# Patient Record
Sex: FEMALE | Race: WHITE | NOT HISPANIC OR LATINO | Employment: PART TIME | URBAN - METROPOLITAN AREA
[De-identification: names, ages, dates, MRNs, and addresses within clinical notes are randomized per-mention and may not be internally consistent; named-entity substitution may affect disease eponyms.]

---

## 2024-01-17 NOTE — PROGRESS NOTES
"Well Woman Visit    CC: Scheduled annual well gyn visit  Chief Complaint   Patient presents with    Discuss fertility     Has been trying to conceive x 6 months, wants to make sure everything is ok.       Myriad intake in the past?: {YES NO:72171}    {IF HAS FILLED OUT MYRIADTABLET IN PAST (Optional):14725}    {Birth control or /HRT (Optional):73033}    HPI:   18 y.o.     Menses:   q *** days, lasts *** days, changes products q ***hrs on heaviest days.     Pain:  {APPAIN:50172}    {PCP:22996}  History: PMHx, Meds, Allergies, PSHx, Social Hx, and POBHx all reviewed and updated.    {APWWECO (Optional):04741}    PHYSICAL EXAM:  /72   Ht 153.7 cm (60.5\")   Wt 75.8 kg (167 lb)   LMP 2023 (Exact Date)   BMI 32.08 kg/m²  Not found.  General- NAD, alert and oriented, appropriate  Psych- Normal mood, good memory  Neck- No masses, no thyroid enlargement  CV- Regular rhythm, no murnurs  Resp- CTA to bases, no wheezes  Abdomen- Soft, non distended, non tender, no masses    Breast left-  Bilaterally symmetrical, no masses, non tender, no nipple discharge  Breast right- Bilaterally symmetrical, no masses, non tender, no nipple discharge    External genitalia- Normal female, no lesions  Urethra/meatus- Normal, no masses, non tender  Bladder- Normal, no masses, non tender  Vagina- Normal, no atrophy, no lesions, no discharge.  {Prolapse (Optional):98889}  Cvx- {APCVX:52054}  Uterus- {APUTERUS:67925}  Adnexa- {APADNEXA:59289}  Anus/Rectum/Perineum- {APRECTAL:96963}    Lymphatic- No palpable neck, axillary, or groin nodes  Ext- No edema, no cyanosis    Skin- No lesions, no rashes, no acanthosis nigricans  {APNEXPLANON (Optional):18236}    ASSESSMENT and PLAN:    Diagnoses and all orders for this visit:    1. Screen for STD (sexually transmitted disease) (Primary)        Preventative:  {WWE Preventative:80345}  {MYRIAD (Optional):02817}  {APGYNCOUNSELING (Optional):00337}  She understands the importance of " having any ordered tests to be performed in a timely fashion.  The risks of not performing them include, but are not limited to, advanced cancer stages, bone loss from osteoporosis and/or subsequent increase in morbidity and/or mortality.  She is encouraged to review her results online and/or contact or office if she has questions.     Follow Up:  No follow-ups on file.    {Separate E+M Time Carve Out (Optional):60696}  {Time Spent-Use for E/M Coding (Optional):05904}      Radha Guerra MD  01/23/2024    [facility]  [facility] [place] [address]  Dept: [phone]  Dept Fax: [phone]  Loc: [phone]  Loc Fax: [phone]

## 2024-01-23 ENCOUNTER — OFFICE VISIT (OUTPATIENT)
Dept: OBSTETRICS AND GYNECOLOGY | Facility: CLINIC | Age: 19
End: 2024-01-23
Payer: COMMERCIAL

## 2024-01-23 ENCOUNTER — PATIENT ROUNDING (BHMG ONLY) (OUTPATIENT)
Dept: OBSTETRICS AND GYNECOLOGY | Facility: CLINIC | Age: 19
End: 2024-01-23
Payer: COMMERCIAL

## 2024-01-23 VITALS
DIASTOLIC BLOOD PRESSURE: 72 MMHG | BODY MASS INDEX: 31.53 KG/M2 | SYSTOLIC BLOOD PRESSURE: 116 MMHG | HEIGHT: 61 IN | WEIGHT: 167 LBS

## 2024-01-23 DIAGNOSIS — Z31.69 ENCOUNTER FOR PRECONCEPTION CONSULTATION: Primary | ICD-10-CM

## 2024-01-23 DIAGNOSIS — Z11.3 SCREEN FOR STD (SEXUALLY TRANSMITTED DISEASE): ICD-10-CM

## 2024-01-23 RX ORDER — SODIUM FLUORIDE AND POTASSIUM NITRATE 5.8; 57.5 MG/ML; MG/ML
GEL, DENTIFRICE DENTAL
COMMUNITY
Start: 2023-10-20

## 2024-01-23 NOTE — PROGRESS NOTES
A My-Chart message has been sent to the patient for PATIENT ROUNDING with [facility].

## 2024-01-23 NOTE — PROGRESS NOTES
"GYN new patient    CC: preconception    Tobacco/Nicotine use:  Yes; vapes a few times a day    HPI:   18 y.o. Contraception or HRT: Contraception:  None  Menses:   q 30 days, lasts 4-5 days, changes products q 4 hrs on heaviest days.   Pain:  None    Pt is interested in conception. No dysmenorrhea, no dyspareunia, no h/o STI/PID, no appendicitis. Has been without contraception for 6 months.  Has never used hormonal contraception.  Her partner does have a child from a previous relationship.  Has intercourse 3x/week on average      History: PMHx, Meds, Allergies, PSHx, Social Hx, and POBHx all reviewed and updated.  PCP:Provider, No Known      Review of Systems     /72   Ht 153.7 cm (60.5\")   Wt 75.8 kg (167 lb)   LMP 2023 (Exact Date)   BMI 32.08 kg/m²     Physical Exam  Vitals and nursing note reviewed.   Constitutional:       Appearance: Normal appearance.   Cardiovascular:      Rate and Rhythm: Normal rate and regular rhythm.      Heart sounds: Normal heart sounds.   Pulmonary:      Effort: Pulmonary effort is normal.      Breath sounds: Normal breath sounds.   Abdominal:      General: Abdomen is flat. Bowel sounds are normal.      Palpations: Abdomen is soft.   Neurological:      Mental Status: She is alert.         ASSESSMENT AND PLAN:  Problem Visit    Diagnoses and all orders for this visit:    1. Encounter for preconception consultation (Primary)  Assessment & Plan:  Pt states she would like to conceive  She Has been having cycles q 30 days for 4-5 days and not using any contraception for the past 6 months.  She has no dysmenorrhea, dyspareunia, STD/PID, appendicitis or chronic pelvic pain  States they have intercourse 3x/week  Her partner has a child from a previous relationship  Explained to the patient I recommend continued timed intercourse  Start PNV   At this point I don't see any obvious concerns.  If no conception in another 6 months would begin an evaluation at that " time      Orders:  -     Prenatal Vit-Iron Carbonyl-FA (PNV Tabs 29-1) 29-1 MG tablet; Take 1 tablet by mouth Daily.  Dispense: 100 tablet; Refill: 3    2. Screen for STD (sexually transmitted disease)  -     Chlamydia / Gonococcus / Mycoplasma Genitalium, NITISH, Urine - Urine, Clean Catch        Counseling:     PNV              Follow Up:  Return for Annual physical.        Radha Guerra MD  01/23/2024

## 2024-01-28 PROBLEM — Z31.69 ENCOUNTER FOR PRECONCEPTION CONSULTATION: Status: ACTIVE | Noted: 2024-01-28

## 2024-01-28 RX ORDER — PRENATAL VIT,CAL 76/IRON/FOLIC 29 MG-1 MG
1 TABLET ORAL DAILY
Qty: 100 TABLET | Refills: 3 | Status: SHIPPED | OUTPATIENT
Start: 2024-01-28

## 2024-01-28 NOTE — ASSESSMENT & PLAN NOTE
Pt states she would like to conceive  She Has been having cycles q 30 days for 4-5 days and not using any contraception for the past 6 months.  She has no dysmenorrhea, dyspareunia, STD/PID, appendicitis or chronic pelvic pain  States they have intercourse 3x/week  Her partner has a child from a previous relationship  Explained to the patient I recommend continued timed intercourse  Start PNV   At this point I don't see any obvious concerns.  If no conception in another 6 months would begin an evaluation at that time

## 2024-02-02 PROCEDURE — 87086 URINE CULTURE/COLONY COUNT: CPT

## 2024-02-03 ENCOUNTER — TELEPHONE (OUTPATIENT)
Dept: URGENT CARE | Facility: CLINIC | Age: 19
End: 2024-02-03
Payer: COMMERCIAL

## 2024-02-03 NOTE — TELEPHONE ENCOUNTER
----- Message from KIKO Diaz sent at 2/3/2024  2:20 PM EST -----  Please call the patient regarding her urine culture. If she is symptomatic for UTI she will need to have a recollection due to culture results being indicative of contamination.

## 2024-05-01 ENCOUNTER — TELEPHONE (OUTPATIENT)
Dept: OBSTETRICS AND GYNECOLOGY | Facility: CLINIC | Age: 19
End: 2024-05-01
Payer: COMMERCIAL

## 2024-05-28 NOTE — PROGRESS NOTES
Well Woman Visit    CC: Scheduled annual well gyn visit  Chief Complaint   Patient presents with    Annual Exam     High anxiety with exams       Myriad intake in the past?: No        Contraception:  None    HPI:   19 y.o.     Menses:   q 30 days, lasts 4-5 days, changes products q  4 hrs on heaviest days.   Pain:  None    PCP: does manage PMHx and preventative labs  History: PMHx, Meds, Allergies, PSHx, Social Hx, and POBHx all reviewed and updated.    Pt has no complaints today.    PHYSICAL EXAM:  /76   Wt 76.7 kg (169 lb)   LMP 2024 (Exact Date)   BMI 32.46 kg/m²  Not found.  General- NAD, alert and oriented, appropriate  Psych- Normal mood, good memory  Neck- No masses, no thyroid enlargement  CV- Regular rhythm, no murnurs  Resp- CTA to bases, no wheezes  Abdomen- Soft, non distended, non tender, no masses    Breast left-  Bilaterally symmetrical, no masses, non tender, no nipple discharge  Breast right- Bilaterally symmetrical, no masses, non tender, no nipple discharge    External genitalia- Normal female, no lesions  Urethra/meatus- Normal, no masses, non tender  Bladder- Normal, no masses, non tender  Vagina- Normal, no atrophy, no lesions, no discharge.    Cvx- Normal, no lesions, no discharge, No cervical motion tenderness  Uterus- Normal size, shape & consistency.  Non tender, mobile.  Adnexa- No mass, non tender  Anus/Rectum/Perineum- Not performed    Lymphatic- No palpable neck, axillary, or groin nodes  Ext- No edema, no cyanosis    Skin- No lesions, no rashes, no acanthosis nigricans      ASSESSMENT and PLAN:    Diagnoses and all orders for this visit:    1. Well woman exam with routine gynecological exam (Primary)  -     IGP,rfx Aptima HPV All Pth  -     Chlamydia trachomatis, Neisseria gonorrhoeae, PCR - Swab, Cervix    2. Screening examination for STD (sexually transmitted disease)  -     Chlamydia trachomatis, Neisseria gonorrhoeae, PCR - Swab, Cervix    3. Encounter for  preconception consultation  Assessment & Plan:  Patient has been trying for conception for little over a year.  This has not happened.  The patient has regular, ovulatory cycles.  She has used ovulation predictor kits with no success.  Her partner has a child from a previous relationship.  The patient is having a pelvic ultrasound later today.  Patient was screened for STDs.  I recommend checking a semen analysis if the ultrasound and semen analysis are normal we would consider an HSG.  Patient states she did not tolerate the prescription prenatal vitamins.  She is taking prenatal Gummies          Preventative:  CERVICAL CANCER Screening- Reviewed current ASCCP guidelines for screening w and wo cotest HPV, age specific.  Screen: Updated today      She understands the importance of having any ordered tests to be performed in a timely fashion.  The risks of not performing them include, but are not limited to, advanced cancer stages, bone loss from osteoporosis and/or subsequent increase in morbidity and/or mortality.  She is encouraged to review her results online and/or contact or office if she has questions.     Follow Up:  Return in about 1 month (around 6/30/2024).            Radha Guerra MD  05/30/2024    [facility] [place] [facility]  [address] [place] [address]  Dept: [phone]  Dept Fax: [phone]  Loc: [phone]  Loc Fax: [phone]

## 2024-05-30 ENCOUNTER — OFFICE VISIT (OUTPATIENT)
Dept: OBSTETRICS AND GYNECOLOGY | Facility: CLINIC | Age: 19
End: 2024-05-30
Payer: COMMERCIAL

## 2024-05-30 VITALS — WEIGHT: 169 LBS | DIASTOLIC BLOOD PRESSURE: 76 MMHG | BODY MASS INDEX: 32.46 KG/M2 | SYSTOLIC BLOOD PRESSURE: 124 MMHG

## 2024-05-30 DIAGNOSIS — Z01.419 WELL WOMAN EXAM WITH ROUTINE GYNECOLOGICAL EXAM: Primary | ICD-10-CM

## 2024-05-30 DIAGNOSIS — Z31.69 ENCOUNTER FOR PRECONCEPTION CONSULTATION: ICD-10-CM

## 2024-05-30 DIAGNOSIS — Z11.3 SCREENING EXAMINATION FOR STD (SEXUALLY TRANSMITTED DISEASE): ICD-10-CM

## 2024-05-30 LAB
C TRACH RRNA CVX QL NAA+PROBE: NOT DETECTED
N GONORRHOEA RRNA SPEC QL NAA+PROBE: NOT DETECTED

## 2024-05-30 PROCEDURE — 87591 N.GONORRHOEAE DNA AMP PROB: CPT | Performed by: OBSTETRICS & GYNECOLOGY

## 2024-05-30 PROCEDURE — 87491 CHLMYD TRACH DNA AMP PROBE: CPT | Performed by: OBSTETRICS & GYNECOLOGY

## 2024-05-30 PROCEDURE — G0123 SCREEN CERV/VAG THIN LAYER: HCPCS | Performed by: OBSTETRICS & GYNECOLOGY

## 2024-05-30 NOTE — ASSESSMENT & PLAN NOTE
Patient has been trying for conception for little over a year.  This has not happened.  The patient has regular, ovulatory cycles.  She has used ovulation predictor kits with no success.  Her partner has a child from a previous relationship.  The patient is having a pelvic ultrasound later today.  Patient was screened for STDs.  I recommend checking a semen analysis if the ultrasound and semen analysis are normal we would consider an HSG.  Patient states she did not tolerate the prescription prenatal vitamins.  She is taking prenatal Gummies

## 2024-05-31 ENCOUNTER — TELEPHONE (OUTPATIENT)
Dept: OBSTETRICS AND GYNECOLOGY | Facility: CLINIC | Age: 19
End: 2024-05-31
Payer: COMMERCIAL

## 2024-05-31 NOTE — TELEPHONE ENCOUNTER
[id number]: lm re next appt in system. If this day and time dont work please call the office to jean/sb

## 2024-06-03 ENCOUNTER — OFFICE VISIT (OUTPATIENT)
Dept: OBSTETRICS AND GYNECOLOGY | Facility: CLINIC | Age: 19
End: 2024-06-03
Payer: COMMERCIAL

## 2024-06-03 VITALS
BODY MASS INDEX: 32.46 KG/M2 | SYSTOLIC BLOOD PRESSURE: 122 MMHG | HEART RATE: 101 BPM | WEIGHT: 169 LBS | DIASTOLIC BLOOD PRESSURE: 81 MMHG

## 2024-06-03 DIAGNOSIS — Z31.69 ENCOUNTER FOR PRECONCEPTION CONSULTATION: Primary | ICD-10-CM

## 2024-06-03 PROCEDURE — 99213 OFFICE O/P EST LOW 20 MIN: CPT | Performed by: OBSTETRICS & GYNECOLOGY

## 2024-06-03 NOTE — PROGRESS NOTES
GYN Visit    CC: Preconception counseling    HPI:   19 y.o. Contraception or HRT: Contraception:  None    Pt has no complaints today.      History: PMHx, Meds, Allergies, PSHx, Social Hx, and POBHx all reviewed and updated.  Physical Exam   PHYSICAL EXAM:  /81   Pulse 101   Wt 76.7 kg (169 lb)   LMP 2024 (Exact Date)   Breastfeeding No   BMI 32.46 kg/m²   General- NAD, alert and oriented, appropriate  Psych- Normal mood, good memory  US Pelvic Complete, Abd + Endovag (2024 14:27)      ASSESSMENT AND PLAN:  Diagnoses and all orders for this visit:    1. Encounter for preconception consultation (Primary)  Assessment & Plan:  Patient is here to follow-up pelvic ultrasound.  Her ultrasound is unremarkable.  Her partner has not yet had a semen analysis performed.  She states that will be done in a week or 2.  We discussed the possible role of scheduling an HSG if semen analysis is negative.  Patient may also benefit from referral to reproductive endocrinology at that time.                  Follow Up:  No follow-ups on file.          Radha Guerra MD  2024

## 2024-06-03 NOTE — ASSESSMENT & PLAN NOTE
Patient is here to follow-up pelvic ultrasound.  Her ultrasound is unremarkable.  Her partner has not yet had a semen analysis performed.  She states that will be done in a week or 2.  We discussed the possible role of scheduling an HSG if semen analysis is negative.  Patient may also benefit from referral to reproductive endocrinology at that time.

## 2024-06-04 LAB
CONV .: NORMAL
CYTOLOGIST CVX/VAG CYTO: NORMAL
CYTOLOGY CVX/VAG DOC CYTO: NORMAL
CYTOLOGY CVX/VAG DOC THIN PREP: NORMAL
DX ICD CODE: NORMAL
Lab: NORMAL
OTHER STN SPEC: NORMAL
STAT OF ADQ CVX/VAG CYTO-IMP: NORMAL

## 2024-11-11 NOTE — PROGRESS NOTES
GYN Visit    CC: Conception    HPI:   19 y.o. Contraception or HRT: None    Pt has no complaints today.    History: PMHx, Meds, Allergies, PSHx, Social Hx, and POBHx all reviewed and updated.    PHYSICAL EXAM:  /75   Pulse 89   Wt 74.8 kg (165 lb)   LMP 11/10/2024 (Exact Date)   Breastfeeding No   BMI 31.69 kg/m²   General- NAD, alert and oriented, appropriate  Psych- Normal mood, good memory        ASSESSMENT AND PLAN:  Diagnoses and all orders for this visit:    1. Encounter for preconception consultation (Primary)  Assessment & Plan:  Patient's partner semen analysis is normal.  Patient's cycles remain ovulatory.  Patient states she and her partner have intercourse 2-3 times a week.  I recommend patient see reproductive endocrinology for further E&M.  Patient was counseled I do not know if these visits will be covered by insurance and they most likely will not be.  Recommend patient continue taking prenatal vitamins OTC that will not contain DHA    Orders:  -     Ambulatory Referral to Infertility            Follow Up:  Return for Annual physical.          Radha Guerra MD  2024

## 2024-11-13 ENCOUNTER — OFFICE VISIT (OUTPATIENT)
Dept: OBSTETRICS AND GYNECOLOGY | Facility: CLINIC | Age: 19
End: 2024-11-13
Payer: COMMERCIAL

## 2024-11-13 VITALS
DIASTOLIC BLOOD PRESSURE: 75 MMHG | HEART RATE: 89 BPM | BODY MASS INDEX: 31.69 KG/M2 | SYSTOLIC BLOOD PRESSURE: 112 MMHG | WEIGHT: 165 LBS

## 2024-11-13 DIAGNOSIS — Z31.69 ENCOUNTER FOR PRECONCEPTION CONSULTATION: Primary | ICD-10-CM

## 2024-11-13 RX ORDER — HYDROCHLOROTHIAZIDE 12.5 MG/1
12.5 TABLET ORAL DAILY
COMMUNITY
Start: 2024-09-24 | End: 2025-03-23

## 2024-11-13 NOTE — ASSESSMENT & PLAN NOTE
Patient's partner semen analysis is normal.  Patient's cycles remain ovulatory.  Patient states she and her partner have intercourse 2-3 times a week.  I recommend patient see reproductive endocrinology for further E&M.  Patient was counseled I do not know if these visits will be covered by insurance and they most likely will not be.  Recommend patient continue taking prenatal vitamins OTC that will not contain DHA

## 2025-03-17 ENCOUNTER — OFFICE VISIT (OUTPATIENT)
Dept: OBSTETRICS AND GYNECOLOGY | Facility: CLINIC | Age: 20
End: 2025-03-17
Payer: COMMERCIAL

## 2025-03-17 VITALS
DIASTOLIC BLOOD PRESSURE: 89 MMHG | WEIGHT: 166 LBS | HEIGHT: 61 IN | BODY MASS INDEX: 31.34 KG/M2 | SYSTOLIC BLOOD PRESSURE: 136 MMHG | HEART RATE: 118 BPM

## 2025-03-17 DIAGNOSIS — Z32.02 PREGNANCY TEST NEGATIVE: ICD-10-CM

## 2025-03-17 DIAGNOSIS — Z31.9 INFERTILITY MANAGEMENT: Primary | ICD-10-CM

## 2025-03-17 PROBLEM — Z31.69 ENCOUNTER FOR PRECONCEPTION CONSULTATION: Status: RESOLVED | Noted: 2024-01-28 | Resolved: 2025-03-17

## 2025-03-17 LAB
B-HCG UR QL: NEGATIVE
EXPIRATION DATE: NORMAL
INTERNAL NEGATIVE CONTROL: NORMAL
INTERNAL POSITIVE CONTROL: NORMAL
Lab: NORMAL

## 2025-03-17 NOTE — ASSESSMENT & PLAN NOTE
Recommend daily multivitamin with folic acid or prenatal vitamin if not preventing pregnancy  Check CBC, CMP, AMH, TSH and free T4, and cycle day 21 progesterone  Check FSH and estradiol on cycle day 3  If labs are all reassuring and ovulation is confirmed then I would recommend HSG

## 2025-03-17 NOTE — PROGRESS NOTES
"[facility]  Gynecological Visit    CC: Infertility    Subjective:   19 y.o. who presents for evaluation due to infertility.  The patient reports that she has been with her current partner and trying to conceive for 3 years.  She reports that have regular unprotected intercourse but she has never even had a pregnancy scare.  She reports that her cycles are regular occurring monthly lasting 3 to 5 days.  She denies any significant dysmenorrhea.  She reports her partner has had a semen analysis and she has had a normal ultrasound.    History:   Past medical history, medications, allergies, surgical history, social history, and obstetrical history all reviewed and updated.    Last Completed Pap Smear            Upcoming       PAP SMEAR (Every 3 Years) Next due on 2024  IGP,rfx Aptima HPV All Pth                          Objective:/89   Pulse 118   Ht 153.7 cm (60.5\")   Wt 75.3 kg (166 lb)   Breastfeeding No   BMI 31.89 kg/m²     Physical Exam  Vitals and nursing note reviewed.   Constitutional:       General: She is not in acute distress.     Appearance: Normal appearance. She is not ill-appearing.   HENT:      Head: Normocephalic and atraumatic.   Neck:      Thyroid: No thyroid mass or thyromegaly.   Abdominal:      General: Abdomen is flat. There is no distension.      Palpations: Abdomen is soft. There is no mass.      Tenderness: There is no abdominal tenderness. There is no guarding or rebound.   Musculoskeletal:         General: No swelling.      Right lower leg: No edema.      Left lower leg: No edema.   Skin:     General: Skin is warm and dry.      Findings: No rash.   Neurological:      Mental Status: She is alert and oriented to person, place, and time.   Psychiatric:         Mood and Affect: Mood normal.         Behavior: Behavior normal.         Thought Content: Thought content normal.       Assessment and Plan:  Diagnoses and all orders for this " visit:    1. Infertility management (Primary)  Assessment & Plan:  Recommend daily multivitamin with folic acid or prenatal vitamin if not preventing pregnancy  Check CBC, CMP, AMH, TSH and free T4, and cycle day 21 progesterone  Check FSH and estradiol on cycle day 3  If labs are all reassuring and ovulation is confirmed then I would recommend HSG      Orders:  -     CBC (No Diff); Future  -     Comprehensive Metabolic Panel; Future  -     TSH; Future  -     T4, free; Future  -     Progesterone; Future  -     Antimullerian Hormone (AMH); Future    2. Pregnancy test negative  -     POC Pregnancy, Urine          Follow Up:  Return if symptoms worsen or fail to improve.    Davey Lane MD  03/17/2025

## 2025-03-21 ENCOUNTER — CLINICAL SUPPORT (OUTPATIENT)
Dept: OBSTETRICS AND GYNECOLOGY | Facility: CLINIC | Age: 20
End: 2025-03-21
Payer: COMMERCIAL

## 2025-03-21 DIAGNOSIS — Z31.9 INFERTILITY MANAGEMENT: ICD-10-CM

## 2025-03-21 LAB
ALBUMIN SERPL-MCNC: 4.2 G/DL (ref 3.5–5.2)
ALBUMIN/GLOB SERPL: 1.4 G/DL
ALP SERPL-CCNC: 63 U/L (ref 39–117)
ALT SERPL W P-5'-P-CCNC: 23 U/L (ref 1–33)
ANION GAP SERPL CALCULATED.3IONS-SCNC: 9.7 MMOL/L (ref 5–15)
AST SERPL-CCNC: 21 U/L (ref 1–32)
BILIRUB SERPL-MCNC: 0.3 MG/DL (ref 0–1.2)
BUN SERPL-MCNC: 10 MG/DL (ref 6–20)
BUN/CREAT SERPL: 11.5 (ref 7–25)
CALCIUM SPEC-SCNC: 9.1 MG/DL (ref 8.6–10.5)
CHLORIDE SERPL-SCNC: 104 MMOL/L (ref 98–107)
CO2 SERPL-SCNC: 22.3 MMOL/L (ref 22–29)
CREAT SERPL-MCNC: 0.87 MG/DL (ref 0.57–1)
DEPRECATED RDW RBC AUTO: 37.7 FL (ref 37–54)
EGFRCR SERPLBLD CKD-EPI 2021: 97.3 ML/MIN/1.73
ERYTHROCYTE [DISTWIDTH] IN BLOOD BY AUTOMATED COUNT: 11.7 % (ref 12.3–15.4)
GLOBULIN UR ELPH-MCNC: 3 GM/DL
GLUCOSE SERPL-MCNC: 61 MG/DL (ref 65–99)
HCT VFR BLD AUTO: 40.6 % (ref 34–46.6)
HGB BLD-MCNC: 13.8 G/DL (ref 12–15.9)
MCH RBC QN AUTO: 30.3 PG (ref 26.6–33)
MCHC RBC AUTO-ENTMCNC: 34 G/DL (ref 31.5–35.7)
MCV RBC AUTO: 89 FL (ref 79–97)
PLATELET # BLD AUTO: 355 10*3/MM3 (ref 140–450)
PMV BLD AUTO: 11.6 FL (ref 6–12)
POTASSIUM SERPL-SCNC: 3.9 MMOL/L (ref 3.5–5.2)
PROGEST SERPL-MCNC: 7.55 NG/ML
PROT SERPL-MCNC: 7.2 G/DL (ref 6–8.5)
RBC # BLD AUTO: 4.56 10*6/MM3 (ref 3.77–5.28)
SODIUM SERPL-SCNC: 136 MMOL/L (ref 136–145)
T4 FREE SERPL-MCNC: 1.23 NG/DL (ref 0.92–1.68)
TSH SERPL DL<=0.05 MIU/L-ACNC: 1.46 UIU/ML (ref 0.27–4.2)
WBC NRBC COR # BLD AUTO: 10.32 10*3/MM3 (ref 3.4–10.8)

## 2025-03-21 PROCEDURE — 82397 CHEMILUMINESCENT ASSAY: CPT | Performed by: OBSTETRICS & GYNECOLOGY

## 2025-03-21 PROCEDURE — 80050 GENERAL HEALTH PANEL: CPT | Performed by: OBSTETRICS & GYNECOLOGY

## 2025-03-21 PROCEDURE — 84439 ASSAY OF FREE THYROXINE: CPT | Performed by: OBSTETRICS & GYNECOLOGY

## 2025-03-21 PROCEDURE — 84144 ASSAY OF PROGESTERONE: CPT | Performed by: OBSTETRICS & GYNECOLOGY

## 2025-03-21 NOTE — PROGRESS NOTES
Venipuncture Blood Specimen Collection  Venipuncture performed in left arm by Jayne Lundberg with good hemostasis. Patient tolerated the procedure well without complications.   03/21/25   Jayne Lundberg

## 2025-03-22 DIAGNOSIS — Z31.9 INFERTILITY MANAGEMENT: Primary | ICD-10-CM

## 2025-03-25 ENCOUNTER — TELEPHONE (OUTPATIENT)
Dept: OBSTETRICS AND GYNECOLOGY | Facility: CLINIC | Age: 20
End: 2025-03-25
Payer: COMMERCIAL

## 2025-03-25 NOTE — TELEPHONE ENCOUNTER
"Per Dr. Lane: \"Please notify the patient that all of her labs are normal. Her progesterone level was 7.55, which is consistent with ovulation for this past cycle. I would like the patient to come in on cycle day 3, or the third day of menstrual bleeding with this neck cycle for further labs. If her third day of bleeding falls on a weekend she should come in the very next business day/Monday.\"    Left a message for the patient to discuss her lab results.   "

## 2025-03-25 NOTE — TELEPHONE ENCOUNTER
Patient called back and informed of her results. She understands where/when to go if CD3 falls on the weekend.

## 2025-03-26 LAB — MIS SERPL-MCNC: 4.26 NG/ML

## 2025-04-01 ENCOUNTER — CLINICAL SUPPORT (OUTPATIENT)
Dept: OBSTETRICS AND GYNECOLOGY | Age: 20
End: 2025-04-01
Payer: COMMERCIAL

## 2025-04-01 DIAGNOSIS — Z31.9 INFERTILITY MANAGEMENT: ICD-10-CM

## 2025-04-01 PROCEDURE — 83001 ASSAY OF GONADOTROPIN (FSH): CPT | Performed by: OBSTETRICS & GYNECOLOGY

## 2025-04-01 PROCEDURE — 82670 ASSAY OF TOTAL ESTRADIOL: CPT | Performed by: OBSTETRICS & GYNECOLOGY

## 2025-04-01 NOTE — PROGRESS NOTES
Venipuncture Blood Specimen Collection  Venipuncture performed in left arm by Jayne Lundberg with good hemostasis. Patient tolerated the procedure well without complications.   04/01/25   Jayne Lundberg

## 2025-04-02 ENCOUNTER — TELEPHONE (OUTPATIENT)
Dept: OBSTETRICS AND GYNECOLOGY | Age: 20
End: 2025-04-02
Payer: COMMERCIAL

## 2025-04-02 ENCOUNTER — RESULTS FOLLOW-UP (OUTPATIENT)
Dept: OBSTETRICS AND GYNECOLOGY | Age: 20
End: 2025-04-02

## 2025-04-02 ENCOUNTER — PATIENT MESSAGE (OUTPATIENT)
Dept: OBSTETRICS AND GYNECOLOGY | Age: 20
End: 2025-04-02

## 2025-04-02 LAB
ESTRADIOL SERPL HS-MCNC: 26.8 PG/ML
FSH SERPL-ACNC: 5.17 MIU/ML

## 2025-04-29 ENCOUNTER — OFFICE VISIT (OUTPATIENT)
Dept: OBSTETRICS AND GYNECOLOGY | Age: 20
End: 2025-04-29
Payer: COMMERCIAL

## 2025-04-29 VITALS
SYSTOLIC BLOOD PRESSURE: 137 MMHG | HEIGHT: 61 IN | WEIGHT: 170 LBS | DIASTOLIC BLOOD PRESSURE: 85 MMHG | BODY MASS INDEX: 32.1 KG/M2

## 2025-04-29 DIAGNOSIS — Z31.9 INFERTILITY MANAGEMENT: Primary | ICD-10-CM

## 2025-04-29 NOTE — ASSESSMENT & PLAN NOTE
The patient's partner has a normal semen analysis  Her laboratories are reassuring.  Ovulation has been confirmed.  We discussed that our next best option would be to evaluate for tubal factor infertility.  The patient would like to proceed with hysterosalpingogram.  The risk, benefits, and alternatives of that procedure were discussed and the patient wishes to proceed.  I have advised no intercourse until after the study is done.  She will need a beta-hCG the day prior to the HSG here in the office.  Recommend ibuprofen 600 mg 30 minutes prior to the appointment for the HSG.  If the HSG is normal we discussed that our next evaluation step would be diagnostic laparoscopy to evaluate for endometriosis.  The patient understands that that could be normal and without evidence of clear etiology for her infertility.  We discussed it would be completely up to her if she decided to proceed with that evaluation

## 2025-04-29 NOTE — PROGRESS NOTES
"[facility]  Gynecological Visit    CC: Follow-up labs    Subjective:   19 y.o. who presents in follow-up of fertility evaluation.  The patient has no new problems or concerns.    History:   Past medical history, medications, allergies, surgical history, social history, and obstetrical history all reviewed and updated.  Her labs are all reassuring, and her progesterone confirmed ovulation.  Her partner has had a semen analysis that is normal.  The patient would like to proceed with hysterosalpingogram    Last Completed Pap Smear            Upcoming       PAP SMEAR (Every 3 Years) Next due on 2024  IGP,rfx Aptima HPV All Pth                          Objective:/85 (BP Location: Right arm, Patient Position: Sitting, Cuff Size: Adult)   Ht 153.7 cm (60.5\")   Wt 77.1 kg (170 lb)   BMI 32.65 kg/m²     Physical Exam  Vitals and nursing note reviewed.   Constitutional:       General: She is not in acute distress.     Appearance: Normal appearance. She is not ill-appearing.   Neurological:      Mental Status: She is alert and oriented to person, place, and time.   Psychiatric:         Mood and Affect: Mood normal.         Behavior: Behavior normal.         Thought Content: Thought content normal.         Judgment: Judgment normal.       Assessment and Plan:  Diagnoses and all orders for this visit:    1. Infertility management (Primary)  Assessment & Plan:  The patient's partner has a normal semen analysis  Her laboratories are reassuring.  Ovulation has been confirmed.  We discussed that our next best option would be to evaluate for tubal factor infertility.  The patient would like to proceed with hysterosalpingogram.  The risk, benefits, and alternatives of that procedure were discussed and the patient wishes to proceed.  I have advised no intercourse until after the study is done.  She will need a beta-hCG the day prior to the HSG here in the office.  Recommend " ibuprofen 600 mg 30 minutes prior to the appointment for the HSG.  If the HSG is normal we discussed that our next evaluation step would be diagnostic laparoscopy to evaluate for endometriosis.  The patient understands that that could be normal and without evidence of clear etiology for her infertility.  We discussed it would be completely up to her if she decided to proceed with that evaluation    Orders:  -     hCG, Quantitative, Pregnancy; Future  -     FL Cath & Intro Hysterosalpingography; Future        Counseling: TRACK MENSES, RTO if <q21 days (frequent) or >q3mo (infrequent IF not on hormonal BC), >7d long, heavy, or painful.      Follow Up:  Return for After HSG.      Davey Lane MD  04/29/2025

## 2025-05-08 ENCOUNTER — TELEPHONE (OUTPATIENT)
Dept: OBSTETRICS AND GYNECOLOGY | Age: 20
End: 2025-05-08
Payer: COMMERCIAL

## 2025-05-14 ENCOUNTER — CLINICAL SUPPORT (OUTPATIENT)
Dept: OBSTETRICS AND GYNECOLOGY | Age: 20
End: 2025-05-14
Payer: COMMERCIAL

## 2025-05-14 DIAGNOSIS — Z31.9 INFERTILITY MANAGEMENT: ICD-10-CM

## 2025-05-14 LAB — HCG INTACT+B SERPL-ACNC: <1 MIU/ML

## 2025-05-14 PROCEDURE — 84702 CHORIONIC GONADOTROPIN TEST: CPT | Performed by: OBSTETRICS & GYNECOLOGY

## 2025-05-14 NOTE — PROGRESS NOTES
Venipuncture Blood Specimen Collection  Venipuncture performed in left arm by Jayne Lundberg with good hemostasis. Patient tolerated the procedure well without complications.   05/14/25   Jayne Lundberg

## 2025-05-15 ENCOUNTER — HOSPITAL ENCOUNTER (OUTPATIENT)
Dept: GENERAL RADIOLOGY | Facility: HOSPITAL | Age: 20
Discharge: HOME | End: 2025-05-15
Payer: COMMERCIAL

## 2025-05-15 DIAGNOSIS — Z31.9 INFERTILITY MANAGEMENT: ICD-10-CM

## 2025-05-15 PROCEDURE — 74740 X-RAY FEMALE GENITAL TRACT: CPT

## 2025-05-15 PROCEDURE — 25510000001 IOPAMIDOL PER 1 ML: Performed by: OBSTETRICS & GYNECOLOGY

## 2025-05-15 RX ORDER — IOPAMIDOL 755 MG/ML
100 INJECTION, SOLUTION INTRAVASCULAR
Status: COMPLETED | OUTPATIENT
Start: 2025-05-15 | End: 2025-05-15

## 2025-05-15 RX ADMIN — IOPAMIDOL 20 ML: 755 INJECTION, SOLUTION INTRAVENOUS at 08:50

## 2025-05-15 NOTE — PROCEDURES
[facility]    Procedure Report    Patient Name:  Yanira Rawls  YOB: 2005  MRN: [id number]    Date of Surgery:       Pre-op Diagnosis:   Infertility       Post-Op Diagnosis Codes:   Infertility        Staff:  Davey Lane MD    Procedure: HSG    Anesthesia: None    Estimated Blood Loss: None    Specimen:          None    Findings: Normal external genitalia, vagina, normal-appearing cervix.  For the HSG results interpretation please see the radiologist dictated note    Complications: None    Description of Procedure: After reviewing the informed consent, including the risks benefits, and alternatives to the procedure the patient expressed her understanding and wished to proceed.  A surgical timeout was performed.  The patient was placed in a frog-leg position on the fluoroscopy table.  A sterile open sided speculum was inserted into the vagina, to visualize the cervix.  The cervix was then prepped x3 with Betadine.  A balloon style HSG catheter was then introduced into the uterine cavity transcervically.  The balloon was inflated with 3 mL of air.  The speculum was removed, and the patient changed her position to a supine position.  After initial  images were obtained by the radiologist, I then pushed 10 mL of contrast material through the balloon catheter into the uterine cavity under live fluoroscopy.  Images were captured at the radiologist discretion.  At the end of the procedure the balloon was deflated, and the catheter removed.  The patient tolerated procedure well and without incident.  Once the official report has been finalized, I will contact the patient with those findings.        Davey Lane MD     Date: 5/15/2025  Time: 09:08 EDT

## 2025-05-21 ENCOUNTER — OFFICE VISIT (OUTPATIENT)
Dept: OBSTETRICS AND GYNECOLOGY | Age: 20
End: 2025-05-21
Payer: COMMERCIAL

## 2025-05-21 VITALS
HEART RATE: 91 BPM | HEIGHT: 61 IN | BODY MASS INDEX: 32.28 KG/M2 | WEIGHT: 171 LBS | SYSTOLIC BLOOD PRESSURE: 133 MMHG | DIASTOLIC BLOOD PRESSURE: 81 MMHG

## 2025-05-21 DIAGNOSIS — N94.10 FEMALE DYSPAREUNIA: ICD-10-CM

## 2025-05-21 DIAGNOSIS — N94.6 DYSMENORRHEA: Primary | ICD-10-CM

## 2025-05-21 PROCEDURE — 99213 OFFICE O/P EST LOW 20 MIN: CPT | Performed by: OBSTETRICS & GYNECOLOGY

## 2025-05-21 NOTE — PROGRESS NOTES
"[facility]  Gynecological Visit    CC: Follow-up HSG    Subjective:   20 y.o. who presents in follow-up of an HSG.  She reports that she was very crampy and uncomfortable for at least 24 hours after the HSG test.  She reports that she does have some cramping with her menstrual cycles but nothing as bad as she experienced after the HSG.  She also does report she has pain with intercourse.  This is not every episode of intercourse but definitely not infrequent.  Also sometimes is positional.    History:   Past medical history, medications, allergies, surgical history, social history, and obstetrical history all reviewed and updated.    Last Completed Pap Smear            Upcoming       PAP SMEAR (Every 3 Years) Next due on 2024  IGP,rfx Aptima HPV All Pth                          Objective:/81   Pulse 91   Ht 153.7 cm (60.5\")   Wt 77.6 kg (171 lb)   LMP 2025   Breastfeeding No   BMI 32.85 kg/m²     Physical Exam  Vitals and nursing note reviewed.   Constitutional:       General: She is not in acute distress.     Appearance: Normal appearance. She is not ill-appearing.   Neurological:      Mental Status: She is alert and oriented to person, place, and time.   Psychiatric:         Mood and Affect: Mood normal.         Behavior: Behavior normal.         Thought Content: Thought content normal.         Judgment: Judgment normal.       Assessment and Plan:  Diagnoses and all orders for this visit:    1. Dysmenorrhea (Primary)  Assessment & Plan:  I have once again reviewed the HSG findings with the patient.  There was no filling defects in the uterine cavity.  There was good filling spill from the right fallopian tube.  The left fallopian tube filled well however there was no spillage of the contrast media from the left fallopian tube suggesting distal obstruction.  We discussed possible etiologies of this cause for obstruction.  We discussed that sometimes " an HSG can actually free the obstruction and may improve chances of pregnancy.  We discussed the patient should be able to conceive as long as there is 1 normal functioning fallopian tube however it may take longer for conception to occur.  For now I have recommended that for the next couple of months she try for spontaneous pregnancy.  The patient has been confirmed to be ovulatory.  I would recommend timed intercourse around expected ovulation.  If no pregnancy in 3 months then we can consider diagnostic laparoscopy to evaluate for endometriosis and other factors particular given the patient has painful intercourse as well as some painful menstrual cycles.  Recommended daily multivitamin with folic acid or prenatal vitamin while attempting pregnancy  Recommend OTC NSAIDs for the dysmenorrhea      2. Female dyspareunia        Counseling: TRACK MENSES, RTO if <q21 days (frequent) or >q3mo (infrequent IF not on hormonal BC), >7d long, heavy, or painful.      Follow Up:  Return in about 3 months (around 8/21/2025) for Recheck.    Davey Lane MD  05/21/2025

## 2025-05-22 PROBLEM — N94.6 DYSMENORRHEA: Status: ACTIVE | Noted: 2025-05-22

## 2025-05-22 PROBLEM — N94.10 FEMALE DYSPAREUNIA: Status: ACTIVE | Noted: 2025-05-22

## 2025-05-22 NOTE — ASSESSMENT & PLAN NOTE
I have once again reviewed the HSG findings with the patient.  There was no filling defects in the uterine cavity.  There was good filling spill from the right fallopian tube.  The left fallopian tube filled well however there was no spillage of the contrast media from the left fallopian tube suggesting distal obstruction.  We discussed possible etiologies of this cause for obstruction.  We discussed that sometimes an HSG can actually free the obstruction and may improve chances of pregnancy.  We discussed the patient should be able to conceive as long as there is 1 normal functioning fallopian tube however it may take longer for conception to occur.  For now I have recommended that for the next couple of months she try for spontaneous pregnancy.  The patient has been confirmed to be ovulatory.  I would recommend timed intercourse around expected ovulation.  If no pregnancy in 3 months then we can consider diagnostic laparoscopy to evaluate for endometriosis and other factors particular given the patient has painful intercourse as well as some painful menstrual cycles.  Recommended daily multivitamin with folic acid or prenatal vitamin while attempting pregnancy  Recommend OTC NSAIDs for the dysmenorrhea

## 2025-08-13 ENCOUNTER — PATIENT MESSAGE (OUTPATIENT)
Dept: OBSTETRICS AND GYNECOLOGY | Age: 20
End: 2025-08-13
Payer: COMMERCIAL

## 2025-08-26 ENCOUNTER — OFFICE VISIT (OUTPATIENT)
Dept: OBSTETRICS AND GYNECOLOGY | Age: 20
End: 2025-08-26
Payer: COMMERCIAL

## 2025-08-26 VITALS
BODY MASS INDEX: 31.72 KG/M2 | WEIGHT: 168 LBS | DIASTOLIC BLOOD PRESSURE: 88 MMHG | SYSTOLIC BLOOD PRESSURE: 129 MMHG | HEART RATE: 86 BPM | HEIGHT: 61 IN

## 2025-08-26 DIAGNOSIS — N94.10 FEMALE DYSPAREUNIA: ICD-10-CM

## 2025-08-26 DIAGNOSIS — N94.6 DYSMENORRHEA: Primary | ICD-10-CM

## 2025-08-26 PROCEDURE — 99213 OFFICE O/P EST LOW 20 MIN: CPT | Performed by: OBSTETRICS & GYNECOLOGY
